# Patient Record
Sex: MALE | Race: WHITE
[De-identification: names, ages, dates, MRNs, and addresses within clinical notes are randomized per-mention and may not be internally consistent; named-entity substitution may affect disease eponyms.]

---

## 2020-02-13 ENCOUNTER — HOSPITAL ENCOUNTER (OUTPATIENT)
Dept: HOSPITAL 75 - RAD | Age: 35
End: 2020-02-13
Attending: FAMILY MEDICINE
Payer: COMMERCIAL

## 2020-02-13 DIAGNOSIS — R74.8: Primary | ICD-10-CM

## 2020-02-13 PROCEDURE — 76705 ECHO EXAM OF ABDOMEN: CPT

## 2020-02-13 NOTE — DIAGNOSTIC IMAGING REPORT
PROCEDURE: US Hepatic (Liver).



TECHNIQUE: Multiple real-time grayscale images were obtained over

the right upper quadrant in various projections.



INDICATION: Elevated liver function tests.



COMPARISON: There are no prior ultrasound examinations available

for comparison.



FINDINGS: The liver is prominent measuring 19 cm in length. Much

of the liver shows increased echogenicity. 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

fatty metamorphosis and this would be consistent with the

findings of the CT abdomen/pelvis exam of 06/06/2014. There was a

small area of diminished echogenicity in the gallbladder fossa. I

suspect that this is related to sparing of the normal liver

parenchyma from fatty metamorphosis. There is no focal mass

involving the liver and the biliary tree is not abnormally

dilated. Spectral and color-flow imaging of the portal vein shows

that the vein is patent.



There is no evidence for cholelithiasis or acute cholecystitis.

The common bile duct as well as the pancreas, the aorta, and the

inferior vena cava were obscured by bowel gas. The right kidney

is unremarkable.



IMPRESSION:

1. The liver is prominent and the echogenic appearance of the

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

2. The area of diminished echogenicity in the gallbladder fossa

is most likely due to sparing of the normal hepatic parenchyma

from fatty metamorphosis as oppose to an underlying abnormality

of the liver. If further study is desired however, then CT would

be recommended.

3. There is no evidence for cholelithiasis or acute

cholecystitis.



Dictated by: 



  Dictated on workstation # [id number]